# Patient Record
Sex: MALE
[De-identification: names, ages, dates, MRNs, and addresses within clinical notes are randomized per-mention and may not be internally consistent; named-entity substitution may affect disease eponyms.]

---

## 2020-04-25 ENCOUNTER — TRANSCRIPTION ENCOUNTER (OUTPATIENT)
Age: 30
End: 2020-04-25

## 2021-03-27 ENCOUNTER — TRANSCRIPTION ENCOUNTER (OUTPATIENT)
Age: 31
End: 2021-03-27

## 2021-03-29 ENCOUNTER — TRANSCRIPTION ENCOUNTER (OUTPATIENT)
Age: 31
End: 2021-03-29

## 2022-08-02 ENCOUNTER — FORM ENCOUNTER (OUTPATIENT)
Age: 32
End: 2022-08-02

## 2022-08-02 PROBLEM — Z00.00 ENCOUNTER FOR PREVENTIVE HEALTH EXAMINATION: Status: ACTIVE | Noted: 2022-08-02

## 2022-08-03 ENCOUNTER — APPOINTMENT (OUTPATIENT)
Dept: MRI IMAGING | Facility: CLINIC | Age: 32
End: 2022-08-03

## 2022-08-03 ENCOUNTER — APPOINTMENT (OUTPATIENT)
Dept: ORTHOPEDIC SURGERY | Facility: CLINIC | Age: 32
End: 2022-08-03

## 2022-08-03 DIAGNOSIS — M25.552 PAIN IN LEFT HIP: ICD-10-CM

## 2022-08-03 PROCEDURE — 73721 MRI JNT OF LWR EXTRE W/O DYE: CPT | Mod: LT

## 2022-08-03 PROCEDURE — 99204 OFFICE O/P NEW MOD 45 MIN: CPT

## 2022-08-03 PROCEDURE — 73502 X-RAY EXAM HIP UNI 2-3 VIEWS: CPT | Mod: LT

## 2022-08-03 NOTE — ASSESSMENT
[FreeTextEntry1] : 32 year M with hx of left Sacroillitis tx with abx in 2004. 3 months of IV abx. With recurrent pain similar to prior sx after a bout of GI illness\par - MRI of the Left hip stat\par - ESR/CRP and WBC labs.

## 2022-08-03 NOTE — HISTORY OF PRESENT ILLNESS
[Sudden] : sudden [8] : 8 [Dull/Aching] : dull/aching [Sharp] : sharp [Tightness] : tightness [Not working due to injury] : Work status: not working due to injury [de-identified] : 31 y/o M presenting with left hip pain X 1 week. History of sacrolitis in 2004, was in hospital and treated with PICC line. Went to ER on 7/31/22 and was given toradol and sent home. Pain is worse with laying down and walking around. stomach virus - with diarrhea. Was seen in the ED - no labs. + toradol.\par \par PMH: None [] : no [FreeTextEntry5] : Jarrett is a 32 year old Male who is here today for LT Hip Pain.\par Had no fall or accident.\par Week before he started to have pain he had a stomach bug.\par Went to the ER Sunday and they found nothing wrong with the hip.\par Pain is constant but worse in the morning when he first gets up.\par Feels better when he stands.\par All the pain is just in the hip on the LT side.\par RT side has started to act up due to him putting more weight on the RT Side\par Wakes up at times due to the pain. [FreeTextEntry9] : Toradol 60 mg shot and  MG [de-identified] : 7/31/22 [de-identified] : [place] [de-identified] : x rays  [de-identified] : [employer]

## 2022-08-03 NOTE — IMAGING
[de-identified] : Constitutional: well developed and well nourished, able to communicate\par Cardiovascular: Peripheral vascular exam is grossly normal\par Neurologic: Alert and oriented, no acute distress.\par Skin: normal skin with no ulcers, rashes, or lesions\par Pulmonary: No respiratory distress, breathing comfortably on room air\par Lymphatics: No obvious lymphadenopathy or lymphedema in areas examined\par \par LOWER EXTREMITY/ RIGHT HIP EXAM\par Standing pelvic alignment: Symmetric with no Trendelenburg\par Atrophy: none\par Ecchymosis/swelling: none\par \par Range of Motion\par Hip: Flexion/extension/ER/IR     / EX 20/ ER 45/ IR 20 / AB 60 /AD 20\par Impingement with flexion adduction and internal rotation: negative\par Contracture: none\par Snapping hip: negative\par Greater trochanter: no tenderness\par \par Neurovascular\par Distal extremities: warm to touch\par Sensation to light touch: intact\par Muscle strength: 5/5\par \par Back\par Alignment: normal\par Spinous process: no tenderness\par Paraspinal tenderness: No tenderness\par Range of motion: full and pain free\par Scoliosis: none\par Straight leg sign: negative\par \par IMAGING:\par 08/03/2022 Xrays of the Right hip 3v were taken demonstrating no signs of fractures, dislocations,or significant arthritis.

## 2022-08-08 ENCOUNTER — APPOINTMENT (OUTPATIENT)
Dept: ORTHOPEDIC SURGERY | Facility: CLINIC | Age: 32
End: 2022-08-08

## 2022-08-08 PROCEDURE — 99213 OFFICE O/P EST LOW 20 MIN: CPT

## 2022-08-08 NOTE — HISTORY OF PRESENT ILLNESS
[Sudden] : sudden [8] : 8 [Dull/Aching] : dull/aching [Sharp] : sharp [Tightness] : tightness [Not working due to injury] : Work status: not working due to injury [de-identified] : 31 y/o M presenting with left hip pain X 1 week. History of sacrolitis in 2004, was in hospital and treated with PICC line. Went to ER on 7/31/22 and was given toradol and sent home. Pain is worse with laying down and walking around. stomach virus - with diarrhea. Was seen in the ED - no labs. + toradol. No bacteria grew out of culture back in 2004.\par \par PMH: None\par \par 8/8/22: Here for follow up. Had MRI hip and labs completed. Continues to have pain with laying down. States has had improvement since initial ER visit. \par \par ESR: 37\par CRP: 27\par WBC: 7.6  [] : no [FreeTextEntry5] : 08/08/2022- Pt here for MRI review of his LT hip, pain levels are still the same \par pt did his blood on 08/04/2022 at [facility]. \par \par 08/04/2022- Jarrett is a 32 year old Male who is here today for LT Hip Pain.\par Had no fall or accident.\par Week before he started to have pain he had a stomach bug.\par Went to the ER Sunday and they found nothing wrong with the hip.\par Pain is constant but worse in the morning when he first gets up.\par Feels better when he stands.\par All the pain is just in the hip on the LT side.\par RT side has started to act up due to him putting more weight on the RT Side\par Wakes up at times due to the pain. [FreeTextEntry9] : Toradol 60 mg shot and  MG [de-identified] : 7/31/22 [de-identified] : [place] [de-identified] : MRI  [de-identified] : [employer]

## 2022-08-08 NOTE — ASSESSMENT
[FreeTextEntry1] : 32 year M with hx of left Sacroillitis tx with abx in 2004. 3 months of IV abx. With recurrent pain similar to prior sx after a bout of GI illness. No large fluid collection noted on MRI. \par \par 08/08/2022 pain has been improving from his initial ED visit. Prior sacroiliits did not identify an organism. This episode could represent a reactive arthritis. Although , his inflammatory markers are elevated which could be from his recent GI illness or inflammation/infection of the SI joint.\par \par - If any worsening of symptoms patient and spouse instructed to go to the ER. Will likely need a CT with IV contrast if sx worsen.\par -Will monitor inflammatory markers\par -Follow up in two weeks with ESR/CRP/CBC\par \par

## 2022-08-08 NOTE — IMAGING
[de-identified] : Constitutional: well developed and well nourished, able to communicate\par Cardiovascular: Peripheral vascular exam is grossly normal\par Neurologic: Alert and oriented, no acute distress.\par Skin: normal skin with no ulcers, rashes, or lesions\par Pulmonary: No respiratory distress, breathing comfortably on room air\par Lymphatics: No obvious lymphadenopathy or lymphedema in areas examined\par \par LOWER EXTREMITY/ RIGHT HIP EXAM\par Standing pelvic alignment: Symmetric with no Trendelenburg\par Atrophy: none\par Ecchymosis/swelling: none\par \par Range of Motion\par Hip: Flexion/extension/ER/IR     / EX 20/ ER 45/ IR 20 / AB 60 /AD 20\par Impingement with flexion adduction and internal rotation: negative\par Contracture: none\par Snapping hip: negative\par Greater trochanter: no tenderness\par \par Neurovascular\par Distal extremities: warm to touch\par Sensation to light touch: intact\par Muscle strength: 5/5\par \par Back\par Alignment: normal\par Spinous process: no tenderness\par Paraspinal tenderness: No tenderness\par Range of motion: full and pain free\par Scoliosis: none\par Straight leg sign: negative\par \par IMAGING:\par 08/03/2022 Xrays of the Right hip 3v were taken demonstrating no signs of fractures, dislocations,or significant arthritis. \par 08/03/22: MRI R Hip: b/l nonspecific inflammatory arthropathy. No fluid in joint. \par ESR and CRP elevated at ESR: 37, CRP: 27, WBC: 7.6,

## 2022-08-25 ENCOUNTER — APPOINTMENT (OUTPATIENT)
Dept: ORTHOPEDIC SURGERY | Facility: CLINIC | Age: 32
End: 2022-08-25

## 2022-08-25 VITALS — HEIGHT: 69 IN | WEIGHT: 230 LBS | BODY MASS INDEX: 34.07 KG/M2

## 2022-08-25 DIAGNOSIS — Z78.9 OTHER SPECIFIED HEALTH STATUS: ICD-10-CM

## 2022-08-25 PROCEDURE — 99214 OFFICE O/P EST MOD 30 MIN: CPT

## 2022-08-25 RX ORDER — MELOXICAM 15 MG/1
15 TABLET ORAL
Qty: 30 | Refills: 2 | Status: ACTIVE | COMMUNITY
Start: 2022-08-25 | End: 1900-01-01

## 2022-08-25 NOTE — ASSESSMENT
[FreeTextEntry1] : 32 year M with hx of left Sacroillitis tx with abx in 2004. 3 months of IV abx. With recurrent pain similar to prior sx after a bout of GI illness. No large fluid collection noted on MRI. \par \par 08/08/2022 pain has been improving from his initial ED visit. Prior sacroiliits did not identify an organism. This episode could represent a reactive arthritis. Although , his inflammatory markers are elevated which could be from his recent GI illness or inflammation/infection of the SI joint.\par \par - If any worsening of symptoms patient and spouse instructed to go to the ER. Will likely need a CT with IV contrast if sx worsen.\par -Will monitor inflammatory markers\par -Follow up in two weeks with ESR/CRP/CBC\par \par 08/25/2022 ESR and CRP downtrending. PT and 1 month fu\par \par

## 2022-08-25 NOTE — HISTORY OF PRESENT ILLNESS
[Dull/Aching] : dull/aching [Rest] : rest [Walking] : walking [Lying in bed] : lying in bed [Sudden] : sudden [8] : 8 [Sharp] : sharp [Tightness] : tightness [Not working due to injury] : Work status: not working due to injury [de-identified] : 33 y/o M presenting with left hip pain X 1 week. History of sacrolitis in 2004, was in hospital and treated with PICC line. Went to ER on 7/31/22 and was given toradol and sent home. Pain is worse with laying down and walking around. stomach virus - with diarrhea. Was seen in the ED - no labs. + toradol. No bacteria grew out of culture back in 2004.\par \par PMH: None\par \par 8/8/22: Here for follow up. Had MRI hip and labs completed. Continues to have pain with laying down. States has had improvement since initial ER visit. \par \par ESR: 37\par CRP: 27\par WBC: 7.6  [] : no [FreeTextEntry5] : 08/08/2022- Pt here for MRI review of his LT hip, pain levels are still the same \par pt did his blood on 08/04/2022 at [facility]. \par \par 08/04/2022- Jarrett is a 32 year old Male who is here today for LT Hip Pain.\par Had no fall or accident.\par Week before he started to have pain he had a stomach bug.\par Went to the ER Sunday and they found nothing wrong with the hip.\par Pain is constant but worse in the morning when he first gets up.\par Feels better when he stands.\par All the pain is just in the hip on the LT side.\par RT side has started to act up due to him putting more weight on the RT Side\par Wakes up at times due to the pain. [FreeTextEntry9] : Toradol 60 mg shot and  MG [de-identified] : 7/31/22 [de-identified] : [place] [de-identified] : MRI  [de-identified] : [employer]

## 2022-09-22 ENCOUNTER — APPOINTMENT (OUTPATIENT)
Dept: ORTHOPEDIC SURGERY | Facility: CLINIC | Age: 32
End: 2022-09-22

## 2022-09-22 VITALS — WEIGHT: 230 LBS | BODY MASS INDEX: 34.07 KG/M2 | HEIGHT: 69 IN

## 2022-09-22 DIAGNOSIS — M53.3 SACROCOCCYGEAL DISORDERS, NOT ELSEWHERE CLASSIFIED: ICD-10-CM

## 2022-09-22 PROCEDURE — 99213 OFFICE O/P EST LOW 20 MIN: CPT

## 2022-09-22 NOTE — HISTORY OF PRESENT ILLNESS
[Sudden] : sudden [0] : 0 [2] : 2 [Dull/Aching] : dull/aching [Sharp] : sharp [Tightness] : tightness [Rest] : rest [Meds] : meds [Walking] : walking [Lying in bed] : lying in bed [Not working due to injury] : Work status: not working due to injury [de-identified] : 31 y/o M presenting with left hip pain X 1 week. History of sacrolitis in 2004, was in hospital and treated with PICC line. Went to ER on 7/31/22 and was given toradol and sent home. Pain is worse with laying down and walking around. stomach virus - with diarrhea. Was seen in the ED - no labs. + toradol. No bacteria grew out of culture back in 2004.\par \par PMH: None\par \par 8/8/22: Here for follow up. Had MRI hip and labs completed. Continues to have pain with laying down. States has had improvement since initial ER visit. \par \par ESR: 37\par CRP: 27\par WBC: 7.6 \par \par \par 9/22/22 GIULIA FRITZ 32 year old M here for eval of left hip. Patient stated his hip is feeling better since last visit. \par  [] : no [FreeTextEntry5] : 08/08/2022- Pt here for MRI review of his LT hip, pain levels are still the same \par pt did his blood on 08/04/2022 at [facility]. \par \par 08/04/2022- Jarrett is a 32 year old Male who is here today for LT Hip Pain.\par Had no fall or accident.\par Week before he started to have pain he had a stomach bug.\par Went to the ER Sunday and they found nothing wrong with the hip.\par Pain is constant but worse in the morning when he first gets up.\par Feels better when he stands.\par All the pain is just in the hip on the LT side.\par RT side has started to act up due to him putting more weight on the RT Side\par Wakes up at times due to the pain. [FreeTextEntry9] : Toradol 60 mg shot and  MG [de-identified] : 7/31/22 [de-identified] : [place] [de-identified] : MRI  [de-identified] : [employer]

## 2022-09-22 NOTE — ASSESSMENT
[FreeTextEntry1] : 32 year M with hx of left Sacroillitis tx with abx in 2004. 3 months of IV abx. With recurrent pain similar to prior sx after a bout of GI illness. No large fluid collection noted on MRI. \par \par 08/08/2022 pain has been improving from his initial ED visit. Prior sacroiliits did not identify an organism. This episode could represent a reactive arthritis. Although , his inflammatory markers are elevated which could be from his recent GI illness or inflammation/infection of the SI joint.\par \par - If any worsening of symptoms patient and spouse instructed to go to the ER. Will likely need a CT with IV contrast if sx worsen.\par -Will monitor inflammatory markers\par -Follow up in two weeks with ESR/CRP/CBC\par \par 08/25/2022 ESR and CRP downtrending. PT and 1 month fu\par \par 09/22/2022 doing well today. cleared for full duty \par

## 2022-09-22 NOTE — IMAGING
[de-identified] : Constitutional: well developed and well nourished, able to communicate\par Cardiovascular: Peripheral vascular exam is grossly normal\par Neurologic: Alert and oriented, no acute distress.\par Skin: normal skin with no ulcers, rashes, or lesions\par Pulmonary: No respiratory distress, breathing comfortably on room air\par Lymphatics: No obvious lymphadenopathy or lymphedema in areas examined\par \par LOWER EXTREMITY/ RIGHT HIP EXAM\par Standing pelvic alignment: Symmetric with no Trendelenburg\par Atrophy: none\par Ecchymosis/swelling: none\par \par Range of Motion\par Hip: Flexion/extension/ER/IR     / EX 20/ ER 45/ IR 20 / AB 60 /AD 20\par Impingement with flexion adduction and internal rotation: negative\par Contracture: none\par Snapping hip: negative\par Greater trochanter: no tenderness\par \par Neurovascular\par Distal extremities: warm to touch\par Sensation to light touch: intact\par Muscle strength: 5/5\par \par Back\par Alignment: normal\par Spinous process: no tenderness\par Paraspinal tenderness: No tenderness\par Range of motion: full and pain free\par Scoliosis: none\par Straight leg sign: negative\par \par IMAGING:\par 08/03/2022 Xrays of the Right hip 3v were taken demonstrating no signs of fractures, dislocations,or significant arthritis. \par 08/03/22: MRI R Hip: b/l nonspecific inflammatory arthropathy. No fluid in joint. \par ESR and CRP elevated at ESR: 37, CRP: 27, WBC: 7.6,